# Patient Record
Sex: FEMALE | URBAN - METROPOLITAN AREA
[De-identification: names, ages, dates, MRNs, and addresses within clinical notes are randomized per-mention and may not be internally consistent; named-entity substitution may affect disease eponyms.]

---

## 2018-05-04 ENCOUNTER — HOSPITAL ENCOUNTER (OUTPATIENT)
Dept: CARDIOLOGY | Facility: CLINIC | Age: 33
End: 2018-05-04
Attending: OBSTETRICS & GYNECOLOGY
Payer: COMMERCIAL

## 2018-05-04 ENCOUNTER — HOSPITAL ENCOUNTER (OUTPATIENT)
Dept: CARDIOLOGY | Facility: CLINIC | Age: 33
Discharge: HOME | End: 2018-05-04
Attending: OBSTETRICS & GYNECOLOGY | Admitting: OBSTETRICS & GYNECOLOGY
Payer: COMMERCIAL

## 2018-05-04 DIAGNOSIS — O30.019 MONOAMNIOTIC AND MONOCHORIONIC TWIN GESTATION, ANTEPARTUM: ICD-10-CM

## 2018-05-04 PROCEDURE — 93325 DOPPLER ECHO COLOR FLOW MAPG: CPT

## 2018-05-04 PROCEDURE — T1013 SIGN LANG/ORAL INTERPRETER: HCPCS | Mod: U3

## 2018-05-04 NOTE — CONSULTS
Fetal Cardiology Consultation    Patient:  Rosa Clemens MRN:  [id number]   YOB: 1985 Age:  32 year old   Date of Visit:  5/4/2018 PCP:  No primary care provider on file.   JORY: 8/30/18 EGA: 23+1 weeks     Dear Dr. Garcia:    I had the pleasure of seeing Rosa Clemens at the [facility] in [place] on 5/4/2018 in consultation for fetal echocardiography results. She presented today by herself; today's visit was facilitated through an . As you know, she is a 32 year old female with multiple gestation (monochorionic diamniotic twins).    In both twins, the fetal echocardiogram was normal. Normal fetal cardiac anatomy. Normal right and left ventricular size and function without hypertrophy. No evidence of diastolic dysfunction. No pericardial effusion. No arrhythmia. Normal RV and LV Tei indices.    I reviewed and interpreted the fetal echocardiogram today. I discussed the normal results with Ms. Clemens with an . While these results are normal, it is important to note that fetal echocardiography cannot exclude small atrial or ventricular septal defects, persistent ductus arteriosus, mild coarctation of the aorta, partial anomalous pulmonary venous return, minor anatomic valve anomalies, or coronary artery anomalies.     Thank you for allowing me to participate in Ms. Clemens's care. Please don't hesitate to contact me or the Fetal Cardiology team at [facility] with any questions or concerns.     I spent a total of 20 minutes face-to-face with Ms. Clemens during today's office visit. Over 50% of this time was spent counseling the patient and/or coordinating care regarding the fetal echocardiography results.     Jb Everett  Pediatric Cardiology  [facility]  Phone [phone]